# Patient Record
Sex: FEMALE | Race: WHITE | URBAN - METROPOLITAN AREA
[De-identification: names, ages, dates, MRNs, and addresses within clinical notes are randomized per-mention and may not be internally consistent; named-entity substitution may affect disease eponyms.]

---

## 2021-10-25 ENCOUNTER — OFFICE VISIT (OUTPATIENT)
Dept: URBAN - METROPOLITAN AREA CLINIC 23 | Facility: CLINIC | Age: 36
End: 2021-10-25

## 2021-11-16 ENCOUNTER — LAB OUTSIDE AN ENCOUNTER (OUTPATIENT)
Dept: URBAN - METROPOLITAN AREA CLINIC 23 | Facility: CLINIC | Age: 36
End: 2021-11-16

## 2021-11-16 ENCOUNTER — WEB ENCOUNTER (OUTPATIENT)
Dept: URBAN - METROPOLITAN AREA CLINIC 78 | Facility: CLINIC | Age: 36
End: 2021-11-16

## 2021-11-16 ENCOUNTER — OFFICE VISIT (OUTPATIENT)
Dept: URBAN - METROPOLITAN AREA CLINIC 23 | Facility: CLINIC | Age: 36
End: 2021-11-16
Payer: COMMERCIAL

## 2021-11-16 ENCOUNTER — WEB ENCOUNTER (OUTPATIENT)
Dept: URBAN - METROPOLITAN AREA CLINIC 23 | Facility: CLINIC | Age: 36
End: 2021-11-16

## 2021-11-16 DIAGNOSIS — K62.5 BLOOD PER RECTUM: ICD-10-CM

## 2021-11-16 DIAGNOSIS — K64.8 OTHER HEMORRHOIDS: ICD-10-CM

## 2021-11-16 PROCEDURE — G8420 CALC BMI NORM PARAMETERS: HCPCS | Performed by: INTERNAL MEDICINE

## 2021-11-16 PROCEDURE — 99204 OFFICE O/P NEW MOD 45 MIN: CPT | Performed by: INTERNAL MEDICINE

## 2021-11-16 PROCEDURE — 1036F TOBACCO NON-USER: CPT | Performed by: INTERNAL MEDICINE

## 2021-11-16 PROCEDURE — G9903 PT SCRN TBCO ID AS NON USER: HCPCS | Performed by: INTERNAL MEDICINE

## 2021-11-16 PROCEDURE — G9622 NO UNHEAL ETOH USER: HCPCS | Performed by: INTERNAL MEDICINE

## 2021-11-16 PROCEDURE — G8427 DOCREV CUR MEDS BY ELIG CLIN: HCPCS | Performed by: INTERNAL MEDICINE

## 2021-11-16 PROCEDURE — 3017F COLORECTAL CA SCREEN DOC REV: CPT | Performed by: INTERNAL MEDICINE

## 2021-11-16 RX ORDER — SODIUM PICOSULFATE, MAGNESIUM OXIDE, AND ANHYDROUS CITRIC ACID 10; 3.5; 12 MG/160ML; G/160ML; G/160ML
160ML LIQUID ORAL AS DIRECTED
Qty: 320 ML | Refills: 0 | OUTPATIENT
Start: 2021-11-16 | End: 2021-11-17

## 2021-11-16 RX ORDER — HYDROCORTISONE ACETATE 25 MG/1
1 SUPPOSITORY SUPPOSITORY RECTAL
Qty: 28 SUPPOSITORIES | Refills: 0 | OUTPATIENT
Start: 2021-11-16 | End: 2021-11-30

## 2021-11-16 NOTE — PHYSICAL EXAM RECTAL:
external hemorrhoids present. No tenderness on EVERETT. Blood is noted on the rectal mucosa on anoscopic exam. Internal hemorrhoids.

## 2021-11-16 NOTE — HPI-TODAY'S VISIT:
36-year-old  female presents for 2 months history of on and off blood per rectum. Fresh blood to brown colored, sometimes clots. On toilet paper and also sometimes mixed with stools and mucousy appearance.  Denies weight loss, family history of colon cancer or IBD. She moves bowel once every other day, various consistency, mostly [place] Stool Scale 3-4.  She had hemorrhoid surgery in March 2019. She had 2 colonoscopies  and an EGD when she was young. Indication of chronic GI issues. All unremarkable per her recall.

## 2021-11-17 LAB
A/G RATIO: 2
ALBUMIN: 4.9
ALKALINE PHOSPHATASE: 67
ALT (SGPT): 7
AST (SGOT): 17
BILIRUBIN, TOTAL: 0.7
BUN/CREATININE RATIO: 14
BUN: 12
C-REACTIVE PROTEIN, QUANT: 8
CALCIUM: 9.4
CARBON DIOXIDE, TOTAL: 22
CHLORIDE: 104
CREATININE: 0.83
EGFR IF AFRICN AM: 106
EGFR IF NONAFRICN AM: 92
GLOBULIN, TOTAL: 2.5
GLUCOSE: 96
HEMATOCRIT: 41.9
HEMOGLOBIN: 13.6
MCH: 27.2
MCHC: 32.5
MCV: 84
NRBC: (no result)
PLATELETS: 211
POTASSIUM: 4.1
PROTEIN, TOTAL: 7.4
RBC: 5
RDW: 12.2
SEDIMENTATION RATE-WESTERGREN: 2
SODIUM: 140
WBC: 6.9

## 2021-11-19 ENCOUNTER — OFFICE VISIT (OUTPATIENT)
Dept: URBAN - METROPOLITAN AREA MEDICAL CENTER 27 | Facility: MEDICAL CENTER | Age: 36
End: 2021-11-19
Payer: COMMERCIAL

## 2021-11-19 ENCOUNTER — TELEPHONE ENCOUNTER (OUTPATIENT)
Dept: URBAN - METROPOLITAN AREA CLINIC 23 | Facility: CLINIC | Age: 36
End: 2021-11-19

## 2021-11-19 DIAGNOSIS — K62.5 ANAL BLEEDING: ICD-10-CM

## 2021-11-19 DIAGNOSIS — K63.89 BACTERIAL OVERGROWTH SYNDROME: ICD-10-CM

## 2021-11-19 PROCEDURE — 45380 COLONOSCOPY AND BIOPSY: CPT | Performed by: INTERNAL MEDICINE

## 2021-11-19 RX ORDER — HYDROCORTISONE ACETATE 25 MG/1
1 SUPPOSITORY SUPPOSITORY RECTAL
Qty: 28 SUPPOSITORIES | Refills: 0 | Status: ACTIVE | COMMUNITY
Start: 2021-11-16 | End: 2021-11-30

## 2021-11-19 RX ORDER — MESALAMINE 1000 MG/1
1 SUPPOSITORY IN AM AND AT BEDTIME SUPPOSITORY RECTAL BID
Qty: 180 | Refills: 1 | OUTPATIENT
Start: 2021-11-19 | End: 2022-05-18

## 2022-01-11 ENCOUNTER — OFFICE VISIT (OUTPATIENT)
Dept: URBAN - METROPOLITAN AREA CLINIC 23 | Facility: CLINIC | Age: 37
End: 2022-01-11

## 2022-02-07 ENCOUNTER — OFFICE VISIT (OUTPATIENT)
Dept: URBAN - METROPOLITAN AREA CLINIC 23 | Facility: CLINIC | Age: 37
End: 2022-02-07
Payer: COMMERCIAL

## 2022-02-07 DIAGNOSIS — K51.30 ULCERATIVE RECTOSIGMOIDITIS WITHOUT COMPLICATION: ICD-10-CM

## 2022-02-07 PROCEDURE — 3017F COLORECTAL CA SCREEN DOC REV: CPT | Performed by: INTERNAL MEDICINE

## 2022-02-07 PROCEDURE — G8427 DOCREV CUR MEDS BY ELIG CLIN: HCPCS | Performed by: INTERNAL MEDICINE

## 2022-02-07 PROCEDURE — G8420 CALC BMI NORM PARAMETERS: HCPCS | Performed by: INTERNAL MEDICINE

## 2022-02-07 PROCEDURE — 1036F TOBACCO NON-USER: CPT | Performed by: INTERNAL MEDICINE

## 2022-02-07 PROCEDURE — 99214 OFFICE O/P EST MOD 30 MIN: CPT | Performed by: INTERNAL MEDICINE

## 2022-02-07 PROCEDURE — G9903 PT SCRN TBCO ID AS NON USER: HCPCS | Performed by: INTERNAL MEDICINE

## 2022-02-07 PROCEDURE — G9622 NO UNHEAL ETOH USER: HCPCS | Performed by: INTERNAL MEDICINE

## 2022-02-07 RX ORDER — MESALAMINE 1000 MG/1
1 SUPPOSITORY IN AM AND AT BEDTIME SUPPOSITORY RECTAL BID
Qty: 180 | Refills: 1 | Status: ACTIVE | COMMUNITY
Start: 2021-11-19 | End: 2022-05-18

## 2022-02-07 RX ORDER — MESALAMINE 1.2 G/1
2 TABLETS WITH A MEAL TABLET, DELAYED RELEASE ORAL ONCE A DAY
Qty: 180 TABLET | Refills: 3 | OUTPATIENT
Start: 2022-02-07 | End: 2023-02-02

## 2022-02-07 NOTE — HPI-TODAY'S VISIT:
36-year-old  female presents for follow-up after colonoscopy for blood per rectum.  The procedure revealed rectosigmoiditis.  Biopsy result confirmed chronic inflammation and diagnosis of ulcerative colitis.  She started taking mesalamine suppository, bleeding has resolved.  She is constipated.  1 bowel movement every few days. Recently she had breast augmentation, constipated more severely with opioid use.  She took laxatives recently-MiraLAX and suppository, now she continues to have looser stools.

## 2022-02-07 NOTE — PREVIOUS WORKUP REVIEWED
.ENDOSCOPIES -Colonoscopy 11/19/2021: External hemorrhoids. Normal TI. Segmental mild inflammation in the rectum.*Pathology: Ascending colon-normal. Transverse colon-normal. Descending colon-rare active inflammation. Sigmoid colon-active inflammation. Rectum-active inflammation. Mild architectural distortion noted.LABS-Labs 11/16/2021: ESR 2, CRP 8, BUN 12, creatinine 0.83, total protein 7.4, albumin 4.9, total bilirubin 0.7, alkaline phosphatase 67, AST 17, ALT 7, WBC 6.9, hemoglobin 13.6, platelet 211.IBD SYNOPSISUC, left sided (rectosigmoid). Dx 2021.Mesalamine, in remission.

## 2022-02-08 ENCOUNTER — TELEPHONE ENCOUNTER (OUTPATIENT)
Dept: URBAN - METROPOLITAN AREA CLINIC 23 | Facility: CLINIC | Age: 37
End: 2022-02-08

## 2022-02-08 ENCOUNTER — TELEPHONE ENCOUNTER (OUTPATIENT)
Dept: URBAN - METROPOLITAN AREA CLINIC 77 | Facility: CLINIC | Age: 37
End: 2022-02-08

## 2022-02-08 RX ORDER — BALSALAZIDE DISODIUM 750 MG/1
2 CAPSULES CAPSULE ORAL TWICE A DAY
Qty: 360 CAPSULE | Refills: 3 | OUTPATIENT
Start: 2022-02-09 | End: 2023-02-04

## 2022-08-08 ENCOUNTER — OFFICE VISIT (OUTPATIENT)
Dept: URBAN - METROPOLITAN AREA CLINIC 23 | Facility: CLINIC | Age: 37
End: 2022-08-08

## 2022-09-30 ENCOUNTER — CLAIMS CREATED FROM THE CLAIM WINDOW (OUTPATIENT)
Dept: URBAN - METROPOLITAN AREA CLINIC 23 | Facility: CLINIC | Age: 37
End: 2022-09-30
Payer: COMMERCIAL

## 2022-09-30 VITALS
TEMPERATURE: 97.9 F | HEIGHT: 62 IN | WEIGHT: 114 LBS | HEART RATE: 97 BPM | DIASTOLIC BLOOD PRESSURE: 79 MMHG | BODY MASS INDEX: 20.98 KG/M2 | SYSTOLIC BLOOD PRESSURE: 117 MMHG

## 2022-09-30 DIAGNOSIS — K51.30 ULCERATIVE RECTOSIGMOIDITIS WITHOUT COMPLICATION: ICD-10-CM

## 2022-09-30 DIAGNOSIS — R11.2 NAUSEA AND VOMITING, UNSPECIFIED VOMITING TYPE: ICD-10-CM

## 2022-09-30 DIAGNOSIS — K21.9 GASTROESOPHAGEAL REFLUX DISEASE, UNSPECIFIED WHETHER ESOPHAGITIS PRESENT: ICD-10-CM

## 2022-09-30 PROBLEM — 41364008: Status: ACTIVE | Noted: 2022-02-07

## 2022-09-30 PROCEDURE — 1036F TOBACCO NON-USER: CPT | Performed by: INTERNAL MEDICINE

## 2022-09-30 PROCEDURE — 99214 OFFICE O/P EST MOD 30 MIN: CPT | Performed by: INTERNAL MEDICINE

## 2022-09-30 PROCEDURE — G9903 PT SCRN TBCO ID AS NON USER: HCPCS | Performed by: INTERNAL MEDICINE

## 2022-09-30 PROCEDURE — G8420 CALC BMI NORM PARAMETERS: HCPCS | Performed by: INTERNAL MEDICINE

## 2022-09-30 PROCEDURE — G8427 DOCREV CUR MEDS BY ELIG CLIN: HCPCS | Performed by: INTERNAL MEDICINE

## 2022-09-30 PROCEDURE — 3017F COLORECTAL CA SCREEN DOC REV: CPT | Performed by: INTERNAL MEDICINE

## 2022-09-30 PROCEDURE — G9622 NO UNHEAL ETOH USER: HCPCS | Performed by: INTERNAL MEDICINE

## 2022-09-30 RX ORDER — PANTOPRAZOLE SODIUM 40 MG/1
1 TABLET TABLET, DELAYED RELEASE ORAL
Qty: 90 TABLET | Refills: 0 | OUTPATIENT
Start: 2022-09-30

## 2022-09-30 NOTE — PREVIOUS WORKUP REVIEWED
.ENDOSCOPIES -Colonoscopy 11/19/2021: External hemorrhoids. Normal TI. Segmental mild inflammation in the rectum.*Pathology: Ascending colon-normal. Transverse colon-normal. Descending colon-rare active inflammation. Sigmoid colon-active inflammation. Rectum-active inflammation. Mild architectural distortion noted.LABS-Labs 11/16/2021: ESR 2, CRP 8, BUN 12, creatinine 0.83, total protein 7.4, albumin 4.9, total bilirubin 0.7, alkaline phosphatase 67, AST 17, ALT 7, WBC 6.9, hemoglobin 13.6, platelet 211.IBD SYNOPSISUC, left sided (rectosigmoid). Dx 2021.Mesalamine, in remission. , -

## 2022-09-30 NOTE — HPI-TODAY'S VISIT:
37-year-old female with ulcerative colitis presents for 6 months history of nausea vomiting. Initially it was every day, currently 2-3 episodes per week.  Typically happens at night 9-10 PM when she lies down in bed.  She usually eats dinner around 7:30 PM.  Heartburn and regurgitation symptoms. For ulcerative colitis, she stopped taking mesalamine since May 2022.  She still has good normal bowel movements.  [place] Stool Scale 3-4, every other day.  No blood in stool. She has been taking sertraline since July, has helped with nausea and anxiety.  Denies frequent NSAIDs use.

## 2022-10-03 LAB
A/G RATIO: 1.7
ALBUMIN: 4.7
ALKALINE PHOSPHATASE: 60
ALT (SGPT): 13
AST (SGOT): 17
BILIRUBIN, TOTAL: 0.9
BUN/CREATININE RATIO: (no result)
BUN: 14
C-REACTIVE PROTEIN, QUANT: 1
CALCIUM: 9.1
CARBON DIOXIDE, TOTAL: 27
CHLORIDE: 104
CREATININE: 0.7
EGFR: 116
GLOBULIN, TOTAL: 2.7
GLUCOSE: 81
H PYLORI BREATH TEST: NOT DETECTED
H. PYLORI BREATH TEST: NOT DETECTED
HEMATOCRIT: 39.2
HEMOGLOBIN: 13.4
IMMUNOGLOBULIN A, QN, SERUM: 230
INTERPRETATION: NOT DETECTED
MCH: 27.5
MCHC: 34.2
MCV: 80.5
MPV: 12.2
PLATELET COUNT: 263
POTASSIUM: 4.1
PROTEIN, TOTAL: 7.4
RDW: 12.5
RED BLOOD CELL COUNT: 4.87
SED RATE BY MODIFIED: 2
SODIUM: 142
T-TRANSGLUTAMINASE (TTG) IGA: <1
VITAMIN B12: 453
VITAMIN D,25-OH,TOTAL,IA: 57
WHITE BLOOD CELL COUNT: 6.3

## 2022-12-06 ENCOUNTER — TELEPHONE ENCOUNTER (OUTPATIENT)
Dept: URBAN - METROPOLITAN AREA CLINIC 23 | Facility: CLINIC | Age: 37
End: 2022-12-06

## 2022-12-06 RX ORDER — ONDANSETRON HYDROCHLORIDE 4 MG/1
1 TABLET TABLET, FILM COATED ORAL
Qty: 30 | Refills: 0 | OUTPATIENT
Start: 2022-12-07

## 2022-12-06 RX ORDER — PANTOPRAZOLE SODIUM 40 MG/1
1 TABLET TABLET, DELAYED RELEASE ORAL
Qty: 90 TABLET | Refills: 0 | Status: ACTIVE | COMMUNITY
Start: 2022-09-30

## 2022-12-15 ENCOUNTER — CLAIMS CREATED FROM THE CLAIM WINDOW (OUTPATIENT)
Dept: URBAN - METROPOLITAN AREA CLINIC 78 | Facility: CLINIC | Age: 37
End: 2022-12-15
Payer: COMMERCIAL

## 2022-12-15 ENCOUNTER — LAB OUTSIDE AN ENCOUNTER (OUTPATIENT)
Dept: URBAN - METROPOLITAN AREA CLINIC 78 | Facility: CLINIC | Age: 37
End: 2022-12-15

## 2022-12-15 VITALS
TEMPERATURE: 97.8 F | HEART RATE: 93 BPM | HEIGHT: 62 IN | WEIGHT: 117 LBS | SYSTOLIC BLOOD PRESSURE: 106 MMHG | DIASTOLIC BLOOD PRESSURE: 71 MMHG | BODY MASS INDEX: 21.53 KG/M2

## 2022-12-15 DIAGNOSIS — K51.50 ULCERATIVE COLITIS, LEFT SIDED, CHRONIC: ICD-10-CM

## 2022-12-15 DIAGNOSIS — R11.2 NAUSEA AND VOMITING, UNSPECIFIED VOMITING TYPE: ICD-10-CM

## 2022-12-15 PROCEDURE — G9622 NO UNHEAL ETOH USER: HCPCS | Performed by: INTERNAL MEDICINE

## 2022-12-15 PROCEDURE — 3017F COLORECTAL CA SCREEN DOC REV: CPT | Performed by: INTERNAL MEDICINE

## 2022-12-15 PROCEDURE — 99214 OFFICE O/P EST MOD 30 MIN: CPT | Performed by: INTERNAL MEDICINE

## 2022-12-15 PROCEDURE — G8420 CALC BMI NORM PARAMETERS: HCPCS | Performed by: INTERNAL MEDICINE

## 2022-12-15 PROCEDURE — G9903 PT SCRN TBCO ID AS NON USER: HCPCS | Performed by: INTERNAL MEDICINE

## 2022-12-15 PROCEDURE — 1036F TOBACCO NON-USER: CPT | Performed by: INTERNAL MEDICINE

## 2022-12-15 PROCEDURE — G8427 DOCREV CUR MEDS BY ELIG CLIN: HCPCS | Performed by: INTERNAL MEDICINE

## 2022-12-15 RX ORDER — SODIUM PICOSULFATE, MAGNESIUM OXIDE, AND ANHYDROUS CITRIC ACID 10; 3.5; 12 MG/160ML; G/160ML; G/160ML
160ML LIQUID ORAL AS DIRECTED
Qty: 320 ML | Refills: 0 | OUTPATIENT
Start: 2022-12-15 | End: 2022-12-16

## 2022-12-15 RX ORDER — PROMETHAZINE HYDROCHLORIDE 25 MG/1
1 TABLET AS NEEDED TABLET ORAL
Qty: 60 TABLET | OUTPATIENT
Start: 2022-12-15 | End: 2023-01-14

## 2022-12-15 RX ORDER — ONDANSETRON HYDROCHLORIDE 4 MG/1
1 TABLET TABLET, FILM COATED ORAL
Qty: 30 | Refills: 0 | Status: ACTIVE | COMMUNITY
Start: 2022-12-07

## 2022-12-15 RX ORDER — PANTOPRAZOLE SODIUM 40 MG/1
1 TABLET TABLET, DELAYED RELEASE ORAL
Qty: 90 TABLET | Refills: 0 | Status: ACTIVE | COMMUNITY
Start: 2022-09-30

## 2022-12-15 NOTE — PHYSICAL EXAM CONSTITUTIONAL:
well developed, well nourished , in no acute distress , ambulating without difficulty , normal communication ability , - , -

## 2022-12-15 NOTE — HPI-TODAY'S VISIT:
37-year-old female with ulcerative colitis presents for follow-up of chronic nausea vomiting and ulcerative colitis.  She would have 2-3 emesis per month.  Pantoprazole has not helped.  She takes history of friend as needed.  Abdomen pain when she is having emesis but otherwise no GI symptom. She takes balsalazide 1 capsule daily.  She moves bowel every other day, [place] Stool Scale type II-IV.  Denies blood in stool.

## 2022-12-15 NOTE — PREVIOUS WORKUP REVIEWED
.ENDOSCOPIES -Colonoscopy 11/19/2021: External hemorrhoids. Normal TI. Segmental mild inflammation in the rectum.*Pathology: Ascending colon-normal. Transverse colon-normal. Descending colon-rare active inflammation. Sigmoid colon-active inflammation. Rectum-active inflammation. Mild architectural distortion noted.LABS-Labs 9/30/2022: ESR 2, CRP 1, vitamin B12 453, vitamin D 57, TTG IgA < 1, IgA 230, H. pylori breath test negative.  Glucose 81, BUN 14, creatinine 0.7, sodium 142, potassium 4.1, total protein 7.4, albumin 4.7, total bilirubin 0.9, alkaline phosphatase 60, AST 17, ALT 13, WBC 6.3, hemoglobin 13.4, platelets 263.-Labs 11/16/2021: ESR 2, CRP 8, BUN 12, creatinine 0.83, total protein 7.4, albumin 4.9, total bilirubin 0.7, alkaline phosphatase 67, AST 17, ALT 7, WBC 6.9, hemoglobin 13.6, platelet 211.IBD SYNOPSISUC, left sided (rectosigmoid). Dx 2021.Mesalamine, in remission.  , - , -

## 2022-12-19 ENCOUNTER — WEB ENCOUNTER (OUTPATIENT)
Dept: URBAN - METROPOLITAN AREA CLINIC 23 | Facility: CLINIC | Age: 37
End: 2022-12-19

## 2022-12-19 RX ORDER — PANTOPRAZOLE SODIUM 40 MG/1
1 TABLET TABLET, DELAYED RELEASE ORAL ONCE A DAY
Qty: 90 TABLET | Refills: 3
Start: 2022-09-30

## 2023-01-03 ENCOUNTER — OUT OF OFFICE VISIT (OUTPATIENT)
Dept: URBAN - METROPOLITAN AREA SURGERY CENTER 8 | Facility: SURGERY CENTER | Age: 38
End: 2023-01-03
Payer: COMMERCIAL

## 2023-01-03 ENCOUNTER — CLAIMS CREATED FROM THE CLAIM WINDOW (OUTPATIENT)
Dept: URBAN - METROPOLITAN AREA CLINIC 4 | Facility: CLINIC | Age: 38
End: 2023-01-03
Payer: COMMERCIAL

## 2023-01-03 DIAGNOSIS — K63.89 BACTERIAL OVERGROWTH SYNDROME: ICD-10-CM

## 2023-01-03 DIAGNOSIS — R11.2 ACUTE NAUSEA WITH NONBILIOUS VOMITING: ICD-10-CM

## 2023-01-03 DIAGNOSIS — K51.00 ACUTE ULCERATIVE PANCOLITIS: ICD-10-CM

## 2023-01-03 DIAGNOSIS — K31.89 OTHER DISEASES OF STOMACH AND DUODENUM: ICD-10-CM

## 2023-01-03 DIAGNOSIS — K31.89 ACQUIRED DEFORMITY OF DUODENUM: ICD-10-CM

## 2023-01-03 PROBLEM — 441971007: Status: ACTIVE | Noted: 2022-12-15

## 2023-01-03 PROCEDURE — 45380 COLONOSCOPY AND BIOPSY: CPT | Performed by: INTERNAL MEDICINE

## 2023-01-03 PROCEDURE — G8907 PT DOC NO EVENTS ON DISCHARG: HCPCS | Performed by: INTERNAL MEDICINE

## 2023-01-03 PROCEDURE — 43239 EGD BIOPSY SINGLE/MULTIPLE: CPT | Performed by: INTERNAL MEDICINE

## 2023-01-03 PROCEDURE — 00813 ANES UPR LWR GI NDSC PX: CPT | Performed by: NURSE ANESTHETIST, CERTIFIED REGISTERED

## 2023-01-03 PROCEDURE — 88305 TISSUE EXAM BY PATHOLOGIST: CPT | Performed by: PATHOLOGY

## 2023-01-03 RX ORDER — PROMETHAZINE HYDROCHLORIDE 25 MG/1
1 TABLET AS NEEDED TABLET ORAL
Qty: 60 TABLET | Status: ACTIVE | COMMUNITY
Start: 2022-12-15 | End: 2023-01-14

## 2023-01-03 RX ORDER — ONDANSETRON HYDROCHLORIDE 4 MG/1
1 TABLET TABLET, FILM COATED ORAL
Qty: 30 | Refills: 0 | Status: ACTIVE | COMMUNITY
Start: 2022-12-07

## 2023-01-03 RX ORDER — PANTOPRAZOLE SODIUM 40 MG/1
1 TABLET TABLET, DELAYED RELEASE ORAL ONCE A DAY
Qty: 90 TABLET | Refills: 3 | Status: ACTIVE | COMMUNITY
Start: 2022-09-30

## 2023-01-06 ENCOUNTER — OFFICE VISIT (OUTPATIENT)
Dept: URBAN - METROPOLITAN AREA CLINIC 22 | Facility: CLINIC | Age: 38
End: 2023-01-06
Payer: COMMERCIAL

## 2023-01-06 DIAGNOSIS — R11.2 NAUSEA AND VOMITING, UNSPECIFIED VOMITING TYPE: ICD-10-CM

## 2023-01-06 PROCEDURE — 76705 ECHO EXAM OF ABDOMEN: CPT | Performed by: INTERNAL MEDICINE

## 2023-01-18 ENCOUNTER — LAB OUTSIDE AN ENCOUNTER (OUTPATIENT)
Dept: URBAN - METROPOLITAN AREA CLINIC 78 | Facility: CLINIC | Age: 38
End: 2023-01-18

## 2023-01-18 ENCOUNTER — TELEPHONE ENCOUNTER (OUTPATIENT)
Dept: URBAN - METROPOLITAN AREA CLINIC 78 | Facility: CLINIC | Age: 38
End: 2023-01-18

## 2023-06-08 ENCOUNTER — WEB ENCOUNTER (OUTPATIENT)
Dept: URBAN - METROPOLITAN AREA CLINIC 23 | Facility: CLINIC | Age: 38
End: 2023-06-08

## 2023-06-08 RX ORDER — BALSALAZIDE DISODIUM 750 MG/1
2 CAPSULES CAPSULE ORAL TWICE A DAY
Qty: 360 CAPSULE | Refills: 3
Start: 2022-02-09 | End: 2024-06-03

## 2023-08-01 ENCOUNTER — OFFICE VISIT (OUTPATIENT)
Dept: URBAN - METROPOLITAN AREA CLINIC 96 | Facility: CLINIC | Age: 38
End: 2023-08-01
Payer: COMMERCIAL

## 2023-08-01 VITALS
DIASTOLIC BLOOD PRESSURE: 75 MMHG | TEMPERATURE: 98.1 F | HEART RATE: 94 BPM | WEIGHT: 118.4 LBS | SYSTOLIC BLOOD PRESSURE: 111 MMHG | BODY MASS INDEX: 20.98 KG/M2 | HEIGHT: 63 IN

## 2023-08-01 DIAGNOSIS — K21.9 GERD: ICD-10-CM

## 2023-08-01 DIAGNOSIS — T88.7XXA DRUG SIDE EFFECTS: ICD-10-CM

## 2023-08-01 DIAGNOSIS — K51.90 UC (ULCERATIVE COLITIS): ICD-10-CM

## 2023-08-01 PROCEDURE — 99214 OFFICE O/P EST MOD 30 MIN: CPT | Performed by: INTERNAL MEDICINE

## 2023-08-01 RX ORDER — MESALAMINE 375 MG/1
4 CAPSULES IN THE MORNING CAPSULE, EXTENDED RELEASE ORAL ONCE A DAY
Qty: 120 CAPSULE | Refills: 11 | OUTPATIENT
Start: 2023-08-01 | End: 2024-07-26

## 2023-08-01 RX ORDER — BALSALAZIDE DISODIUM 750 MG/1
2 CAPSULES CAPSULE ORAL TWICE A DAY
Qty: 360 CAPSULE | Refills: 3 | Status: ACTIVE | COMMUNITY
Start: 2022-02-09 | End: 2024-06-03

## 2023-08-01 RX ORDER — PROMETHAZINE HYDROCHLORIDE 12.5 MG/1
1 TABLET AS NEEDED TABLET ORAL
Qty: 60 | Refills: 1 | OUTPATIENT
Start: 2023-08-01 | End: 2023-09-30

## 2023-08-01 RX ORDER — PANTOPRAZOLE SODIUM 40 MG/1
1 TABLET TABLET, DELAYED RELEASE ORAL ONCE A DAY
Qty: 90 TABLET | Refills: 3 | Status: ACTIVE | COMMUNITY
Start: 2022-09-30

## 2023-08-01 RX ORDER — ONDANSETRON HYDROCHLORIDE 4 MG/1
1 TABLET TABLET, FILM COATED ORAL
Qty: 30 | Refills: 0 | Status: ACTIVE | COMMUNITY
Start: 2022-12-07

## 2023-08-01 NOTE — HPI-TODAY'S VISIT:
Pt Brad is a 38 y/o indiv with left sided UC, on balsalazide (2 tab daily), here for evalu of her dz. . Pt referred by Sg Goodman (pt of Dr. Connor). . Pt was dxd with UC in Nov 2021.  Started off with canasa then started on balsalazide. . Today on 8/1/2023, pt reports that she has vomiting about 2 time per month, for no reason has sudden onset of nausea.  The rectal bleeding has overall stopped but has episodes every 1-2 times per month.  The bowel consistency is variable, soft and hard.  Bleeding has improved on the Colazol, but still has element of constipation (1 BM every other day).  Pt has stable weight (117, down from 125 previously).No joint pain or rashes.  Has stabbing abdominal occur once per week, nothing makes it better or worse.  Pt has diarrhea if she takes 2 tab BID vs 2 daily.  Is on protonix. . 2/2023: A) Duodenum, Second Part (D2), Biopsy: NO SIGNIFICANT ABNORMALITY. (B) Stomach, Antrum and Body, Biopsy: NO SIGNIFICANT ABNORMALITY. (C) Colon, Ascending, Biopsy: NO SIGNIFICANT ABNORMALITY. (D) Colon, Transverse, Biopsy: NO SIGNIFICANT ABNORMALITY. (E) Colon, Descending, Biopsy: NO SIGNIFICANT ABNORMALITY. (F) Colon, Sigmoid, Biopsy: NO SIGNIFICANT ABNORMALITY. (G) Rectum, Biopsy: NO SIGNIFICANT ABNORMALITY. . 11/2021: Final Pathologic Diagnosis (A). ASCENDING COLON, BIOPSY: - COLONIC MUCOSA, WITHIN NORMAL LIMITS. (B). TRANSVERSE COLON, BIOPSY: - COLONIC MUCOSA, WITHIN NORMAL LIMITS. (C). DESCENDING COLON, BIOPSY: - COLONIC MUCOSA WITH RARE ACTIVE INFLAMMATION. (SEE MICROSCOPIC DESCRIPTION). (D). SIGMOID COLON, BIOPSY: - COLONIC MUCOSA WITH ACTIVE INFLAMMATION. (SEE MICROSCOPIC DESCRIPTION) (E). RECTUM, BIOPSY: - COLONIC MUCOSA WITH ACTIVE INFLAMMATION. (SEE MICROSCOPIC DESCRIPTION)  provider/primary care, new lab/imaging results, prior authorizations, and orders.

## 2023-08-18 LAB
A/G RATIO: 2.1
ALBUMIN: 4.8
ALKALINE PHOSPHATASE: 59
ALT (SGPT): 8
AST (SGOT): 18
BASO (ABSOLUTE): 0.1
BASOS: 1
BILIRUBIN, TOTAL: 0.9
BUN/CREATININE RATIO: 13
BUN: 11
CALCIUM: 8.7
CARBON DIOXIDE, TOTAL: 25
CHLORIDE: 105
CHOLESTEROL, TOTAL: 144
COMMENT:: (no result)
CREATININE: 0.86
EGFR: 90
EOS (ABSOLUTE): 0.2
EOS: 3
ESTRADIOL: 37.1
FERRITIN, SERUM: 63
GLOBULIN, TOTAL: 2.3
GLUCOSE: 91
HDL CHOLESTEROL: 46
HEMATOCRIT: 40.7
HEMATOLOGY COMMENTS:: (no result)
HEMOGLOBIN: 13.4
IMMATURE CELLS: (no result)
IMMATURE GRANS (ABS): 0
IMMATURE GRANULOCYTES: 0
IRON BIND.CAP.(TIBC): 299
IRON SATURATION: 14
IRON: 43
LDL CHOL CALC (NIH): 70
LYMPHS (ABSOLUTE): 1.8
LYMPHS: 31
MCH: 27.6
MCHC: 32.9
MCV: 84
MONOCYTES(ABSOLUTE): 0.5
MONOCYTES: 8
NEUTROPHILS (ABSOLUTE): 3.2
NEUTROPHILS: 57
NRBC: (no result)
PLATELETS: 265
POTASSIUM: 3.8
PROGESTERONE: 0.3
PROTEIN, TOTAL: 7.1
RBC: 4.85
RDW: 13
SODIUM: 142
T4,FREE(DIRECT): 1.26
TRIGLYCERIDES: 167
TSH: 0.81
UIBC: 256
VITAMIN B12: 441
VITAMIN D, 25-HYDROXY: 56.3
VLDL CHOLESTEROL CAL: 28
WBC: 5.7

## 2023-11-02 ENCOUNTER — OFFICE VISIT (OUTPATIENT)
Dept: URBAN - METROPOLITAN AREA TELEHEALTH 2 | Facility: TELEHEALTH | Age: 38
End: 2023-11-02
Payer: COMMERCIAL

## 2023-11-02 ENCOUNTER — DASHBOARD ENCOUNTERS (OUTPATIENT)
Age: 38
End: 2023-11-02

## 2023-11-02 ENCOUNTER — TELEPHONE ENCOUNTER (OUTPATIENT)
Dept: URBAN - METROPOLITAN AREA CLINIC 96 | Facility: CLINIC | Age: 38
End: 2023-11-02

## 2023-11-02 DIAGNOSIS — T88.7XXA DRUG SIDE EFFECTS: ICD-10-CM

## 2023-11-02 DIAGNOSIS — K21.9 GERD: ICD-10-CM

## 2023-11-02 DIAGNOSIS — K51.80 OTHER ULCERATIVE COLITIS: ICD-10-CM

## 2023-11-02 PROCEDURE — 99213 OFFICE O/P EST LOW 20 MIN: CPT | Performed by: INTERNAL MEDICINE

## 2023-11-02 RX ORDER — BALSALAZIDE DISODIUM 750 MG/1
2 CAPSULES CAPSULE ORAL TWICE A DAY
Qty: 360 CAPSULE | Refills: 3 | Status: ACTIVE | COMMUNITY
Start: 2022-02-09 | End: 2024-06-03

## 2023-11-02 RX ORDER — PANTOPRAZOLE SODIUM 40 MG/1
1 TABLET TABLET, DELAYED RELEASE ORAL ONCE A DAY
Qty: 90 TABLET | Refills: 3 | Status: ACTIVE | COMMUNITY
Start: 2022-09-30

## 2023-11-02 RX ORDER — BALSALAZIDE DISODIUM 750 MG/1
4 CAPSULES CAPSULE ORAL ONCE DAILY
Qty: 120 | Refills: 11 | OUTPATIENT
Start: 2023-11-02 | End: 2024-10-27

## 2023-11-02 RX ORDER — MESALAMINE 375 MG/1
4 CAPSULES IN THE MORNING CAPSULE, EXTENDED RELEASE ORAL ONCE A DAY
Qty: 120 CAPSULE | Refills: 11 | Status: DISCONTINUED | COMMUNITY
Start: 2023-08-01 | End: 2024-07-26

## 2023-11-02 RX ORDER — ONDANSETRON HYDROCHLORIDE 4 MG/1
1 TABLET TABLET, FILM COATED ORAL
Qty: 30 | Refills: 0 | Status: ACTIVE | COMMUNITY
Start: 2022-12-07

## 2023-11-02 NOTE — HPI-TODAY'S VISIT:
Pt Brad is a 39 y/o indiv with left sided UC, on balsalazide (4 tab daily), here for evalu of her dz. . Pt referred by Sg Goodman (pt of Dr. Connor). . Pt was dxd with UC in Nov 2021.  Started off with canasa then started on balsalazide. . Previously on 8/1/2023, pt reports that she has vomiting about 2 time per month, for no reason has sudden onset of nausea.  The rectal bleeding has overall stopped but has episodes every 1-2 times per month.  The bowel consistency is variable, soft and hard.  Bleeding has improved on the Colazol, but still has element of constipation (1 BM every other day).  Pt has stable weight (117, down from 125 previously).No joint pain or rashes.  Has stabbing abdominal occur once per week, nothing makes it better or worse.  Pt has diarrhea if she takes 2 tab BID vs 2 daily.  Is on protonix. . Today on 11/2/2023, she is feeling lot better, having less flares compared to prior.  Bleeding is improved, now having 4-5 BM per week.  Gaviscon is really helping as well.  Not having same degree of diarrhea . 2/2023: A) Duodenum, Second Part (D2), Biopsy: NO SIGNIFICANT ABNORMALITY. (B) Stomach, Antrum and Body, Biopsy: NO SIGNIFICANT ABNORMALITY. (C) Colon, Ascending, Biopsy: NO SIGNIFICANT ABNORMALITY. (D) Colon, Transverse, Biopsy: NO SIGNIFICANT ABNORMALITY. (E) Colon, Descending, Biopsy: NO SIGNIFICANT ABNORMALITY. (F) Colon, Sigmoid, Biopsy: NO SIGNIFICANT ABNORMALITY. (G) Rectum, Biopsy: NO SIGNIFICANT ABNORMALITY. . 11/2021: Final Pathologic Diagnosis (A). ASCENDING COLON, BIOPSY: - COLONIC MUCOSA, WITHIN NORMAL LIMITS. (B). TRANSVERSE COLON, BIOPSY: - COLONIC MUCOSA, WITHIN NORMAL LIMITS. (C). DESCENDING COLON, BIOPSY: - COLONIC MUCOSA WITH RARE ACTIVE INFLAMMATION. (SEE MICROSCOPIC DESCRIPTION). (D). SIGMOID COLON, BIOPSY: - COLONIC MUCOSA WITH ACTIVE INFLAMMATION. (SEE MICROSCOPIC DESCRIPTION) (E). RECTUM, BIOPSY: - COLONIC MUCOSA WITH ACTIVE INFLAMMATION. (SEE MICROSCOPIC DESCRIPTION)  provider/primary care, new lab/imaging results, prior authorizations, and orders.

## 2023-11-26 ENCOUNTER — WEB ENCOUNTER (OUTPATIENT)
Dept: URBAN - METROPOLITAN AREA CLINIC 98 | Facility: CLINIC | Age: 38
End: 2023-11-26

## 2023-11-26 RX ORDER — BALSALAZIDE DISODIUM 750 MG/1
4 CAPSULES CAPSULE ORAL ONCE DAILY
Qty: 120 | Refills: 11
Start: 2023-11-02 | End: 2024-11-21

## 2023-11-26 RX ORDER — PANTOPRAZOLE SODIUM 40 MG/1
1 TABLET TABLET, DELAYED RELEASE ORAL ONCE A DAY
Qty: 90 TABLET | Refills: 3
Start: 2022-09-30

## 2023-11-28 ENCOUNTER — WEB ENCOUNTER (OUTPATIENT)
Dept: URBAN - METROPOLITAN AREA CLINIC 98 | Facility: CLINIC | Age: 38
End: 2023-11-28

## 2023-11-28 RX ORDER — BALSALAZIDE DISODIUM 750 MG/1
4 CAPSULES CAPSULE ORAL ONCE DAILY
Qty: 120 | Refills: 11
Start: 2023-11-02 | End: 2024-11-23

## 2023-11-28 RX ORDER — PANTOPRAZOLE SODIUM 40 MG/1
1 TABLET TABLET, DELAYED RELEASE ORAL ONCE A DAY
Qty: 90 TABLET | Refills: 3
Start: 2022-09-30

## 2024-12-20 ENCOUNTER — TELEPHONE ENCOUNTER (OUTPATIENT)
Dept: URBAN - METROPOLITAN AREA CLINIC 96 | Facility: CLINIC | Age: 39
End: 2024-12-20

## 2024-12-29 ENCOUNTER — WEB ENCOUNTER (OUTPATIENT)
Dept: URBAN - METROPOLITAN AREA CLINIC 96 | Facility: CLINIC | Age: 39
End: 2024-12-29

## 2024-12-29 RX ORDER — BALSALAZIDE DISODIUM 750 MG/1
4 CAPSULES CAPSULE ORAL ONCE DAILY
Qty: 120 | Refills: 3
Start: 2023-11-02 | End: 2025-04-29

## 2025-01-07 ENCOUNTER — WEB ENCOUNTER (OUTPATIENT)
Dept: URBAN - METROPOLITAN AREA CLINIC 96 | Facility: CLINIC | Age: 40
End: 2025-01-07

## 2025-01-07 RX ORDER — PANTOPRAZOLE SODIUM 40 MG/1
1 TABLET TABLET, DELAYED RELEASE ORAL ONCE A DAY
Qty: 90 TABLET | Refills: 3
Start: 2022-09-30

## 2025-03-13 ENCOUNTER — OFFICE VISIT (OUTPATIENT)
Dept: URBAN - METROPOLITAN AREA TELEHEALTH 2 | Facility: TELEHEALTH | Age: 40
End: 2025-03-13
Payer: COMMERCIAL

## 2025-03-13 ENCOUNTER — TELEPHONE ENCOUNTER (OUTPATIENT)
Dept: URBAN - METROPOLITAN AREA CLINIC 96 | Facility: CLINIC | Age: 40
End: 2025-03-13

## 2025-03-13 ENCOUNTER — LAB OUTSIDE AN ENCOUNTER (OUTPATIENT)
Dept: URBAN - METROPOLITAN AREA TELEHEALTH 2 | Facility: TELEHEALTH | Age: 40
End: 2025-03-13

## 2025-03-13 DIAGNOSIS — K51.50 ULCERATIVE COLITIS, LEFT SIDED, CHRONIC: ICD-10-CM

## 2025-03-13 DIAGNOSIS — K21.9 GERD: ICD-10-CM

## 2025-03-13 DIAGNOSIS — T88.7XXA DRUG SIDE EFFECTS: ICD-10-CM

## 2025-03-13 DIAGNOSIS — K51.90 UC (ULCERATIVE COLITIS): ICD-10-CM

## 2025-03-13 PROCEDURE — 99213 OFFICE O/P EST LOW 20 MIN: CPT | Performed by: INTERNAL MEDICINE

## 2025-03-13 RX ORDER — PREDNISONE 10 MG/1
1 TABLET TABLET ORAL ONCE A DAY
Qty: 14 TABLET | Refills: 0 | OUTPATIENT
Start: 2025-03-13 | End: 2025-03-27

## 2025-03-13 RX ORDER — ONDANSETRON HYDROCHLORIDE 4 MG/1
1 TABLET TABLET, FILM COATED ORAL
Qty: 30 | Refills: 0 | COMMUNITY
Start: 2022-12-07

## 2025-03-13 RX ORDER — PANTOPRAZOLE SODIUM 40 MG/1
1 TABLET TABLET, DELAYED RELEASE ORAL ONCE A DAY
Qty: 90 TABLET | Refills: 3 | COMMUNITY
Start: 2022-09-30

## 2025-03-13 RX ORDER — BALSALAZIDE DISODIUM 750 MG/1
6 CAPSULES CAPSULE ORAL ONCE DAILY
Qty: 540 | Refills: 4 | OUTPATIENT
Start: 2025-03-13 | End: 2026-06-06

## 2025-03-13 RX ORDER — BALSALAZIDE DISODIUM 750 MG/1
4 CAPSULES CAPSULE ORAL ONCE DAILY
Qty: 120 | Refills: 3 | COMMUNITY
Start: 2023-11-02 | End: 2025-04-29

## 2025-03-13 RX ORDER — PANTOPRAZOLE SODIUM 40 MG/1
1 TABLET TABLET, DELAYED RELEASE ORAL EVERY 12 HOURS
Qty: 180 TABLET | Refills: 4 | OUTPATIENT
Start: 2025-03-13

## 2025-03-13 NOTE — HPI-TODAY'S VISIT:
Pt Brad is a 38 y/o indiv with left sided UC, on balsalazide (4 tab daily), here for evalu of her dz. . Pt referred by Sg Goodman (pt of Dr. Connor). . Pt was dxd with UC in Nov 2021.  Started off with canasa then started on balsalazide. . Previously on 8/1/2023, pt reports that she has vomiting about 2 time per month, for no reason has sudden onset of nausea.  The rectal bleeding has overall stopped but has episodes every 1-2 times per month.  The bowel consistency is variable, soft and hard.  Bleeding has improved on the Colazol, but still has element of constipation (1 BM every other day).  Pt has stable weight (117, down from 125 previously).No joint pain or rashes.  Has stabbing abdominal occur once per week, nothing makes it better or worse.  Pt has diarrhea if she takes 2 tab BID vs 2 daily.  Is on protonix. Prev on 11/2/2023, she is feeling lot better, having less flares compared to prior.  Bleeding is improved, now having 4-5 BM per week.  Gaviscon is really helping as well.  Not having same degree of diarrhea . Today on 3/13/2025, has B every other day.  had flare of rectal bleeding 2weeks, but overall doing well. . 2/2023: A) Duodenum, Second Part (D2), Biopsy: NO SIGNIFICANT ABNORMALITY. (B) Stomach, Antrum and Body, Biopsy: NO SIGNIFICANT ABNORMALITY. (C) Colon, Ascending, Biopsy: NO SIGNIFICANT ABNORMALITY. (D) Colon, Transverse, Biopsy: NO SIGNIFICANT ABNORMALITY. (E) Colon, Descending, Biopsy: NO SIGNIFICANT ABNORMALITY. (F) Colon, Sigmoid, Biopsy: NO SIGNIFICANT ABNORMALITY. (G) Rectum, Biopsy: NO SIGNIFICANT ABNORMALITY. . 11/2021: Final Pathologic Diagnosis (A). ASCENDING COLON, BIOPSY: - COLONIC MUCOSA, WITHIN NORMAL LIMITS. (B). TRANSVERSE COLON, BIOPSY: - COLONIC MUCOSA, WITHIN NORMAL LIMITS. (C). DESCENDING COLON, BIOPSY: - COLONIC MUCOSA WITH RARE ACTIVE INFLAMMATION. (SEE MICROSCOPIC DESCRIPTION). (D). SIGMOID COLON, BIOPSY: - COLONIC MUCOSA WITH ACTIVE INFLAMMATION. (SEE MICROSCOPIC DESCRIPTION) (E). RECTUM, BIOPSY: - COLONIC MUCOSA WITH ACTIVE INFLAMMATION. (SEE MICROSCOPIC DESCRIPTION)  provider/primary care, new lab/imaging results, prior authorizations, and orders.

## 2025-04-24 ENCOUNTER — CLAIMS CREATED FROM THE CLAIM WINDOW (OUTPATIENT)
Dept: URBAN - METROPOLITAN AREA CLINIC 4 | Facility: CLINIC | Age: 40
End: 2025-04-24
Payer: COMMERCIAL

## 2025-04-24 ENCOUNTER — OFFICE VISIT (OUTPATIENT)
Dept: URBAN - METROPOLITAN AREA SURGERY CENTER 18 | Facility: SURGERY CENTER | Age: 40
End: 2025-04-24
Payer: COMMERCIAL

## 2025-04-24 DIAGNOSIS — K51.90 ULCERATIVE COLITIS: ICD-10-CM

## 2025-04-24 DIAGNOSIS — K31.89 OTHER DISEASES OF STOMACH AND DUODENUM: ICD-10-CM

## 2025-04-24 DIAGNOSIS — R10.13 ABDOMINAL DISCOMFORT, EPIGASTRIC: ICD-10-CM

## 2025-04-24 DIAGNOSIS — K51.00 ACUTE ULCERATIVE PANCOLITIS: ICD-10-CM

## 2025-04-24 DIAGNOSIS — K63.89 OTHER SPECIFIED DISEASES OF INTESTINE: ICD-10-CM

## 2025-04-24 PROCEDURE — 45380 COLONOSCOPY AND BIOPSY: CPT | Performed by: INTERNAL MEDICINE

## 2025-04-24 PROCEDURE — 00813 ANES UPR LWR GI NDSC PX: CPT | Performed by: NURSE ANESTHETIST, CERTIFIED REGISTERED

## 2025-04-24 PROCEDURE — 88312 SPECIAL STAINS GROUP 1: CPT | Performed by: PATHOLOGY

## 2025-04-24 PROCEDURE — 88305 TISSUE EXAM BY PATHOLOGIST: CPT | Performed by: PATHOLOGY

## 2025-04-24 PROCEDURE — 43239 EGD BIOPSY SINGLE/MULTIPLE: CPT | Performed by: INTERNAL MEDICINE

## 2025-04-24 RX ORDER — ONDANSETRON HYDROCHLORIDE 4 MG/1
1 TABLET TABLET, FILM COATED ORAL
Qty: 30 | Refills: 0 | COMMUNITY
Start: 2022-12-07

## 2025-04-24 RX ORDER — BALSALAZIDE DISODIUM 750 MG/1
4 CAPSULES CAPSULE ORAL ONCE DAILY
Qty: 120 | Refills: 3 | COMMUNITY
Start: 2023-11-02 | End: 2025-04-29

## 2025-04-24 RX ORDER — PANTOPRAZOLE SODIUM 40 MG/1
1 TABLET TABLET, DELAYED RELEASE ORAL EVERY 12 HOURS
Qty: 180 TABLET | Refills: 4 | Status: ACTIVE | COMMUNITY
Start: 2025-03-13

## 2025-04-24 RX ORDER — PANTOPRAZOLE SODIUM 40 MG/1
1 TABLET TABLET, DELAYED RELEASE ORAL ONCE A DAY
Qty: 90 TABLET | Refills: 3 | COMMUNITY
Start: 2022-09-30

## 2025-04-24 RX ORDER — BALSALAZIDE DISODIUM 750 MG/1
6 CAPSULES CAPSULE ORAL ONCE DAILY
Qty: 540 | Refills: 4 | Status: ACTIVE | COMMUNITY
Start: 2025-03-13 | End: 2026-06-06

## 2025-04-24 NOTE — HPI-TODAY'S VISIT:
Pt Brad is a 40 y/o indiv with left sided UC, on balsalazide (4 tab daily), here for evalu of her dz. . Pt referred by Sg Goodman (pt of Dr. Connor). . Pt was dxd with UC in Nov 2021.  Started off with canasa then started on balsalazide. . Previously on 8/1/2023, pt reports that she has vomiting about 2 time per month, for no reason has sudden onset of nausea.  The rectal bleeding has overall stopped but has episodes every 1-2 times per month.  The bowel consistency is variable, soft and hard.  Bleeding has improved on the Colazol, but still has element of constipation (1 BM every other day).  Pt has stable weight (117, down from 125 previously).No joint pain or rashes.  Has stabbing abdominal occur once per week, nothing makes it better or worse.  Pt has diarrhea if she takes 2 tab BID vs 2 daily.  Is on protonix. Prev on 11/2/2023, she is feeling lot better, having less flares compared to prior.  Bleeding is improved, now having 4-5 BM per week.  Gaviscon is really helping as well.  Not having same degree of diarrhea . Today on 3/13/2025, has B every other day.  had flare of rectal bleeding 2weeks, but overall doing well. . 2/2023: A) Duodenum, Second Part (D2), Biopsy: NO SIGNIFICANT ABNORMALITY. (B) Stomach, Antrum and Body, Biopsy: NO SIGNIFICANT ABNORMALITY. (C) Colon, Ascending, Biopsy: NO SIGNIFICANT ABNORMALITY. (D) Colon, Transverse, Biopsy: NO SIGNIFICANT ABNORMALITY. (E) Colon, Descending, Biopsy: NO SIGNIFICANT ABNORMALITY. (F) Colon, Sigmoid, Biopsy: NO SIGNIFICANT ABNORMALITY. (G) Rectum, Biopsy: NO SIGNIFICANT ABNORMALITY. . 11/2021: Final Pathologic Diagnosis (A). ASCENDING COLON, BIOPSY: - COLONIC MUCOSA, WITHIN NORMAL LIMITS. (B). TRANSVERSE COLON, BIOPSY: - COLONIC MUCOSA, WITHIN NORMAL LIMITS. (C). DESCENDING COLON, BIOPSY: - COLONIC MUCOSA WITH RARE ACTIVE INFLAMMATION. (SEE MICROSCOPIC DESCRIPTION). (D). SIGMOID COLON, BIOPSY: - COLONIC MUCOSA WITH ACTIVE INFLAMMATION. (SEE MICROSCOPIC DESCRIPTION) (E). RECTUM, BIOPSY: - COLONIC MUCOSA WITH ACTIVE INFLAMMATION. (SEE MICROSCOPIC DESCRIPTION)  provider/primary care, new lab/imaging results, prior authorizations, and orders.

## 2025-05-13 ENCOUNTER — OFFICE VISIT (OUTPATIENT)
Dept: URBAN - METROPOLITAN AREA TELEHEALTH 2 | Facility: TELEHEALTH | Age: 40
End: 2025-05-13
Payer: COMMERCIAL

## 2025-05-13 ENCOUNTER — LAB OUTSIDE AN ENCOUNTER (OUTPATIENT)
Dept: URBAN - METROPOLITAN AREA TELEHEALTH 2 | Facility: TELEHEALTH | Age: 40
End: 2025-05-13

## 2025-05-13 DIAGNOSIS — K51.50 ULCERATIVE COLITIS, LEFT SIDED, CHRONIC: ICD-10-CM

## 2025-05-13 DIAGNOSIS — K21.9 GERD: ICD-10-CM

## 2025-05-13 PROCEDURE — 99213 OFFICE O/P EST LOW 20 MIN: CPT | Performed by: INTERNAL MEDICINE

## 2025-05-13 RX ORDER — BALSALAZIDE DISODIUM 750 MG/1
6 CAPSULES CAPSULE ORAL ONCE DAILY
Qty: 540 | Refills: 4 | OUTPATIENT
Start: 2025-05-13

## 2025-05-13 RX ORDER — BALSALAZIDE DISODIUM 750 MG/1
6 CAPSULES CAPSULE ORAL ONCE DAILY
Qty: 540 | Refills: 4 | Status: ACTIVE | COMMUNITY
Start: 2025-03-13 | End: 2026-06-06

## 2025-05-13 RX ORDER — PANTOPRAZOLE SODIUM 40 MG/1
1 TABLET TABLET, DELAYED RELEASE ORAL EVERY 12 HOURS
Qty: 180 TABLET | Refills: 4 | OUTPATIENT
Start: 2025-05-13

## 2025-05-13 RX ORDER — PANTOPRAZOLE SODIUM 40 MG/1
1 TABLET TABLET, DELAYED RELEASE ORAL EVERY 12 HOURS
Qty: 180 TABLET | Refills: 4 | Status: ACTIVE | COMMUNITY
Start: 2025-03-13

## 2025-05-13 RX ORDER — PANTOPRAZOLE SODIUM 40 MG/1
1 TABLET TABLET, DELAYED RELEASE ORAL ONCE A DAY
Qty: 90 TABLET | Refills: 3 | Status: ON HOLD | COMMUNITY
Start: 2022-09-30

## 2025-05-13 RX ORDER — ONDANSETRON HYDROCHLORIDE 4 MG/1
1 TABLET TABLET, FILM COATED ORAL
Qty: 30 | Refills: 0 | Status: ON HOLD | COMMUNITY
Start: 2022-12-07

## 2025-05-13 NOTE — HPI-TODAY'S VISIT:
Pt Brad is a 40 y/o indiv with left sided UC, on balsalazide (4 tab daily), here for evalu of her dz. . Pt referred by Sg Goodman (pt of Dr. Connor). . Pt was dxd with UC in Nov 2021.  Started off with canasa then started on balsalazide. . Previously on 8/1/2023, pt reports that she has vomiting about 2 time per month, for no reason has sudden onset of nausea.  The rectal bleeding has overall stopped but has episodes every 1-2 times per month.  The bowel consistency is variable, soft and hard.  Bleeding has improved on the Colazol, but still has element of constipation (1 BM every other day).  Pt has stable weight (117, down from 125 previously).No joint pain or rashes.  Has stabbing abdominal occur once per week, nothing makes it better or worse.  Pt has diarrhea if she takes 2 tab BID vs 2 daily.  Is on protonix. Prev on 11/2/2023, she is feeling lot better, having less flares compared to prior.  Bleeding is improved, now having 4-5 BM per week.  Gaviscon is really helping as well.  Not having same degree of diarrhea . Prev on 3/13/2025, has B every other day.  had flare of rectal bleeding 2weeks, but overall doing well. . Today on 5/13/2025, overall doing well.  Noticing that her bm are more sticky.  4/2025 No additional abnormalities were found on retroflexion. Biopsies were obtained in the ascending colon, in the descending colon, in the transverse colon, in the sigmoid colon and in the rectum with cold forceps for histology. Overall good control of disease state.   Impression: The examination was otherwise normal. Biopsies were obtained in the ascending colon, in the descending colon, in the transverse colon, in the sigmoid colon and in the rectum Bleeding is likely due to hemorrhoid. Possible flat polyp in the splenic flexure area, but examined very carefully and nothing could be found (despite cleaning).  (A) Duodenum, Biopsy (Cold Forceps): NO SIGNIFICANT ABNORMALITY. (B) Stomach, Body;Random, Biopsy (Cold Forceps): NO SIGNIFICANT ABNORMALITY. (C) Colon, Ascending, Biopsy (Cold Forceps): NO SIGNIFICANT ABNORMALITY. (D) Colon, Transverse, Biopsy (Cold Forceps): NO SIGNIFICANT ABNORMALITY. (E) Colon, Descending, Biopsy (Cold Forceps): NO SIGNIFICANT ABNORMALITY. (F) Colon, Sigmoid, Biopsy (Cold Forceps): NO SIGNIFICANT ABNORMALITY. (G) Rectum, Biopsy (Cold Forceps): NO SIGNIFICANT ABNORMALITY . 2/2023: A) Duodenum, Second Part (D2), Biopsy: NO SIGNIFICANT ABNORMALITY. (B) Stomach, Antrum and Body, Biopsy: NO SIGNIFICANT ABNORMALITY. (C) Colon, Ascending, Biopsy: NO SIGNIFICANT ABNORMALITY. (D) Colon, Transverse, Biopsy: NO SIGNIFICANT ABNORMALITY. (E) Colon, Descending, Biopsy: NO SIGNIFICANT ABNORMALITY. (F) Colon, Sigmoid, Biopsy: NO SIGNIFICANT ABNORMALITY. (G) Rectum, Biopsy: NO SIGNIFICANT ABNORMALITY. . 11/2021: Final Pathologic Diagnosis (A). ASCENDING COLON, BIOPSY: - COLONIC MUCOSA, WITHIN NORMAL LIMITS. (B). TRANSVERSE COLON, BIOPSY: - COLONIC MUCOSA, WITHIN NORMAL LIMITS. (C). DESCENDING COLON, BIOPSY: - COLONIC MUCOSA WITH RARE ACTIVE INFLAMMATION. (SEE MICROSCOPIC DESCRIPTION). (D). SIGMOID COLON, BIOPSY: - COLONIC MUCOSA WITH ACTIVE INFLAMMATION. (SEE MICROSCOPIC DESCRIPTION) (E). RECTUM, BIOPSY: - COLONIC MUCOSA WITH ACTIVE INFLAMMATION. (SEE MICROSCOPIC DESCRIPTION)  provider/primary care, new lab/imaging results, prior authorizations, and orders.